# Patient Record
Sex: MALE | Race: WHITE | NOT HISPANIC OR LATINO | Employment: FULL TIME | URBAN - METROPOLITAN AREA
[De-identification: names, ages, dates, MRNs, and addresses within clinical notes are randomized per-mention and may not be internally consistent; named-entity substitution may affect disease eponyms.]

---

## 2017-01-03 ENCOUNTER — CLINICAL SUPPORT (OUTPATIENT)
Dept: REHABILITATION | Facility: HOSPITAL | Age: 37
End: 2017-01-03
Attending: ORTHOPAEDIC SURGERY
Payer: COMMERCIAL

## 2017-01-03 DIAGNOSIS — M25.512 CHRONIC LEFT SHOULDER PAIN: Primary | ICD-10-CM

## 2017-01-03 DIAGNOSIS — G89.29 CHRONIC LEFT SHOULDER PAIN: Primary | ICD-10-CM

## 2017-01-03 PROCEDURE — 97110 THERAPEUTIC EXERCISES: CPT

## 2017-01-03 PROCEDURE — 97161 PT EVAL LOW COMPLEX 20 MIN: CPT

## 2017-01-03 NOTE — PROGRESS NOTES
"[facility]   PATIENT EVALUATION    Date: 01/03/2017  Start Time: 8:00  Stop Time: 9:00    Patient Name: Vipul Urbano  Clinic Number: [id number]  Age: 36 y.o.  Gender: male    Diagnosis:   Encounter Diagnosis   Name Primary?    Chronic left shoulder pain Yes       Referring Physician: Ivania Spann MD  Treatment Orders: PT Eval and Treat      History     No past medical history on file.    No current outpatient prescriptions on file.     No current facility-administered medications for this visit.        Review of patient's allergies indicates:  No Known Allergies      Subjective     History of Present Condition: Pt reports that he was performing cross fit and had an overhead lift that he felt something painful. Pt reports pain has improved, but "something is still not right". Additionally, pt reports that head down position increases numbness/tingling in arms, but quickly subside with repositioning    Onset Date: Nov 2016  Date of Surgery: NA  Precautions: NA    Mechanism of Injury: sudden    Pain Location: shoulder   Pain Description: Aching  Current Pain: 0/10  Least Pain: 0/10  Worst Pain: 5/10  Aggravating Factors: overhead activity, sitting at desk for prolonged period of time  Relieving Factors: changing position    Diagnostic Tests:   1.  Rotator cuff tendinosis with low-grade partial-thickness undersurface tear of the distal supraspinatus tendon.  2.  Moderate acromioclavicular arthrosis.  Prior Therapy: Yes - pt recieved DN    Occupation:   Job Status: working  Job Duties: sitting, desk work    Sports/Recreational Activities: crossfit, running throwing with kids  Extremity Dominance: R    Prior Level of Function: Independent  Functional Deficits Leading to Referral/Nature of Injury: Pain with activity, pain with work  Patient Therapy Goals: reduce pain and return to crossfit, no pain following work day  Cultural/Environmental/Spiritual Barriers to " Treatment or Learning: none      Objective     Observation: Pt entered with no brace. Pt had no signs of bruising/swelling/redness noted.  Posture: FHP, rounded shoulder    Palpation: TTP upper trap, bicep tendon, coracoid process    Shoulder Range of Motion: ROM equal and WNL B    Joint Mobility: increased  anterior translation B    Strength:     Left:  Flex: 5/5  Abd: 5/5  IR: 3/5  ER: 3/5    Right:  Flex: 5/5  Abd: 5/5  IR: 3/5  ER: 3/5    Scapular Control/Dyskinesis: + inferior angle L scap dyskinesis    Special Tests:   +scapular assistance test  +Neers      Other: Pt demosntrated RC weakness B and scapular dyskinesis on L shoulder; posterior capsule/cuff tightness B    Treatment:   No money 3 x 10  Sleeper stretch 5:30 sec  ER wlakout 3 x 10  Foam roll x 5'    Functional Limitations Reports - G Codes  Category: Mobility  Tool: FOTO  Score: 62        Assessment     This is a 36 y.o. male referred to outpatient physical therapy and presents with a medical diagnosis of L shoulder pain and demonstrates limitations as described in the problem list. Pt demonstrates excellent rehab potential. Pt will benefit from physcial therapy services in order to maximize pain free and/or functional use of left shoulder. The following goals were discussed with the patient and patient is in agreement with them as to be addressed in the treatment plan. Pt was given a HEP consisting of     No money 3 x 10  Sleeper stretch 5:30 sec  ER wlakout 3 x 10  Foam roll x 10'    . Pt verbally understood the instructions as they were given and demonstrated proper form and technique during therapy. Pt was advised to perform these exercises free of pain, and to stop performing them if pain occurs.     Medical necessity is demonstrated by the following problem list:   - Pain limits function of effected part for all activities  - Unable to participate in daily activities   - Requires skilled supervision to complete and progress HEP  - Fall risk -  impaired balance   - Continued inability to participate in vocational pursuits    Short Term Goals (4-6 Weeks):  - Pt will increase IR strength to 4/5 B  - Pt will increase ER strength to 4/5 B  - Decrease Pain to 0 at rest  - Pt to self correct posture with scapular retractions   - Pt independent with HEP with progressions.     Long Term Goals (10-12 Weeks):  - Pt will increase IR strength to 5/5 B  - Pt will increase ER strength to 5/5 B in neutral  - Decrease Pain to 0 with lifting 5lbs overhead   - Pt to demonstrate 5/5 ER strength at 90/90   - Pt to return to throwing with no increase in s/s  - Pt to demonstrate proper throwing motion      Plan     Pt will be treated by physical therapy 1-2 times a week for 10-12 weeks for manual therapy, therapeutic exercise, home exercise program, patient education, and modalities PRN to achieve established goals. Vipul may at times be seen by a PTA as part of the Rehab Team.     Sheree Simpson, PT, DPT  01/03/2017    I CERTIFY THE NEED FOR THESE SERVICES FURNISHED UNDER THIS PLAN OF TREATMENT AND WHILE UNDER MY CARE    Physician's comments: ________________________________________________________________________________________________________________________________________________    Physician's Name: ___________________________________

## 2017-01-06 ENCOUNTER — CLINICAL SUPPORT (OUTPATIENT)
Dept: REHABILITATION | Facility: HOSPITAL | Age: 37
End: 2017-01-06
Attending: ORTHOPAEDIC SURGERY
Payer: COMMERCIAL

## 2017-01-06 DIAGNOSIS — M25.512 CHRONIC LEFT SHOULDER PAIN: Primary | ICD-10-CM

## 2017-01-06 DIAGNOSIS — G89.29 CHRONIC LEFT SHOULDER PAIN: Primary | ICD-10-CM

## 2017-01-06 PROCEDURE — 97110 THERAPEUTIC EXERCISES: CPT

## 2017-01-06 NOTE — PROGRESS NOTES
Physical Therapy Daily Note     Date: 01/06/2017  Name: Vipul Urbano  Clinic Number: [id number]  Diagnosis:   Encounter Diagnosis   Name Primary?    Chronic left shoulder pain Yes     Physician: Ivania Spann MD    Evaluation Date: 1/3  Date of Surgery:   Return to MD Date:   Visit #: 2 of   Start Time:  8:00  Stop Time:  9:00  Total Treatment Time: 60    Precautions: NA    Subjective     Pt reports that his HEP is going well. No major changes to report    Pain: not specified    Objective     Measurements taken:     Patient received individual therapy to increase strength, endurance, ROM, flexibility, posture and core stabilization with activities as follows:     Vipul received therapeutic exercises to develop strength, endurance, ROM, flexibility, posture and core stabilization for 45 minutes including:     No money 3 x 10  ER walkout 3 x 10  SL ER 3 x 10   SL flexion 3x 10  Prone row 3 x 10 #3  Prone ext 3 x 10  #3  LM 3 x 10  Punch 3 x 10    Sleeper stretch 5:30 sec     Cryotherapy x 10'    Vipul received the following manual therapy techniques: Joint mobilizations and Soft tissue Mobilization were applied to the:  for 0 minutes.       Written Home Exercises Provided: Yes  Pt demo good understanding of the education provided. Vipul demonstrated good return demonstration of activities.     Education provided:  Vipul verbalized good understanding of education provided.   No spiritual or educational barriers to learning identified.    Assessment       Excellent form with therex. Pt demonstrated good ability with scpaular retraction throughout therex. Cont to progress strengthening.     This is a 36 y.o. male referred to outpatient physical therapy and presents with a medical diagnosis of l shoulder painb and demonstrates limitations as described in the problem list Pt prognosis is Good. Pt will continue to benefit from skilled outpatient physical therapy to  address the deficits listed below in the problem list, provide pt/family education and to maximize pt's level of independence in the home and community environment.    Will the patient continue to benefit from skilled PT intervention? Yes        Medical necessity is demonstrated by:   - Pain limits function of effected part for all activities  - Unable to participate in daily activities   - Requires skilled supervision to complete and progress HEP  - Fall risk - impaired balance   - Continued inability to participate in vocational pursuits    Progress towards goals:     New/Revised Goals:       Plan   Continue with established Plan of Care towards PT goals.      Sheree Simpson, PT, DPT  01/06/2017

## 2017-01-06 NOTE — PLAN OF CARE
"[facility]   PATIENT EVALUATION    Date: 01/03/2017  Start Time: 8:00  Stop Time: 9:00    Patient Name: Vipul Urbano  Clinic Number: [id number]  Age: 36 y.o.  Gender: male    Diagnosis:   Encounter Diagnosis   Name Primary?    Chronic left shoulder pain Yes       Referring Physician: Ivania Spann MD  Treatment Orders: PT Eval and Treat      History     No past medical history on file.    No current outpatient prescriptions on file.     No current facility-administered medications for this visit.        Review of patient's allergies indicates:  No Known Allergies      Subjective     History of Present Condition: Pt reports that he was performing cross fit and had an overhead lift that he felt something painful. Pt reports pain has improved, but "something is still not right". Additionally, pt reports that head down position increases numbness/tingling in arms, but quickly subside with repositioning    Onset Date: Nov 2016  Date of Surgery: NA  Precautions: NA    Mechanism of Injury: sudden    Pain Location: shoulder   Pain Description: Aching  Current Pain: 0/10  Least Pain: 0/10  Worst Pain: 5/10  Aggravating Factors: overhead activity, sitting at desk for prolonged period of time  Relieving Factors: changing position    Diagnostic Tests:   1.  Rotator cuff tendinosis with low-grade partial-thickness undersurface tear of the distal supraspinatus tendon.  2.  Moderate acromioclavicular arthrosis.  Prior Therapy: Yes - pt recieved DN    Occupation:   Job Status: working  Job Duties: sitting, desk work    Sports/Recreational Activities: crossfit, running throwing with kids  Extremity Dominance: R    Prior Level of Function: Independent  Functional Deficits Leading to Referral/Nature of Injury: Pain with activity, pain with work  Patient Therapy Goals: reduce pain and return to crossfit, no pain following work day  Cultural/Environmental/Spiritual Barriers to " Treatment or Learning: none      Objective     Observation: Pt entered with no brace. Pt had no signs of bruising/swelling/redness noted.  Posture: FHP, rounded shoulder    Palpation: TTP upper trap, bicep tendon, coracoid process    Shoulder Range of Motion: ROM equal and WNL B    Joint Mobility: increased  anterior translation B    Strength:     Left:  Flex: 5/5  Abd: 5/5  IR: 3/5  ER: 3/5    Right:  Flex: 5/5  Abd: 5/5  IR: 3/5  ER: 3/5    Scapular Control/Dyskinesis: + inferior angle L scap dyskinesis    Special Tests:   +scapular assistance test  +Neers      Other: Pt demosntrated RC weakness B and scapular dyskinesis on L shoulder; posterior capsule/cuff tightness B    Treatment:   No money 3 x 10  Sleeper stretch 5:30 sec  ER wlakout 3 x 10  Foam roll x 5'    Functional Limitations Reports - G Codes  Category: Mobility  Tool: FOTO  Score: 62        Assessment     This is a 36 y.o. male referred to outpatient physical therapy and presents with a medical diagnosis of L shoulder pain and demonstrates limitations as described in the problem list. Pt demonstrates excellent rehab potential. Pt will benefit from physcial therapy services in order to maximize pain free and/or functional use of left shoulder. The following goals were discussed with the patient and patient is in agreement with them as to be addressed in the treatment plan. Pt was given a HEP consisting of     No money 3 x 10  Sleeper stretch 5:30 sec  ER wlakout 3 x 10  Foam roll x 10'    . Pt verbally understood the instructions as they were given and demonstrated proper form and technique during therapy. Pt was advised to perform these exercises free of pain, and to stop performing them if pain occurs.     Medical necessity is demonstrated by the following problem list:   - Pain limits function of effected part for all activities  - Unable to participate in daily activities   - Requires skilled supervision to complete and progress HEP  - Fall risk -  impaired balance   - Continued inability to participate in vocational pursuits    Short Term Goals (4-6 Weeks):  - Pt will increase IR strength to 4/5 B  - Pt will increase ER strength to 4/5 B  - Decrease Pain to 0 at rest  - Pt to self correct posture with scapular retractions   - Pt independent with HEP with progressions.     Long Term Goals (10-12 Weeks):  - Pt will increase IR strength to 5/5 B  - Pt will increase ER strength to 5/5 B in neutral  - Decrease Pain to 0 with lifting 5lbs overhead   - Pt to demonstrate 5/5 ER strength at 90/90   - Pt to return to throwing with no increase in s/s  - Pt to demonstrate proper throwing motion      Plan     Pt will be treated by physical therapy 1-2 times a week for 10-12 weeks for manual therapy, therapeutic exercise, home exercise program, patient education, and modalities PRN to achieve established goals. Vipul may at times be seen by a PTA as part of the Rehab Team.     Sheree Simpson, PT, DPT  01/03/2017    I CERTIFY THE NEED FOR THESE SERVICES FURNISHED UNDER THIS PLAN OF TREATMENT AND WHILE UNDER MY CARE    Physician's comments: ________________________________________________________________________________________________________________________________________________    Physician's Name: ___________________________________

## 2017-01-10 ENCOUNTER — CLINICAL SUPPORT (OUTPATIENT)
Dept: REHABILITATION | Facility: HOSPITAL | Age: 37
End: 2017-01-10
Attending: ORTHOPAEDIC SURGERY
Payer: COMMERCIAL

## 2017-01-10 DIAGNOSIS — M25.512 CHRONIC LEFT SHOULDER PAIN: Primary | ICD-10-CM

## 2017-01-10 DIAGNOSIS — G89.29 CHRONIC LEFT SHOULDER PAIN: Primary | ICD-10-CM

## 2017-01-10 PROCEDURE — 97110 THERAPEUTIC EXERCISES: CPT

## 2017-01-12 NOTE — PROGRESS NOTES
Physical Therapy Daily Note     Date: 01/12/2017  Name: Vipul Urbano  Clinic Number: [id number]  Diagnosis:   Encounter Diagnosis   Name Primary?    Chronic left shoulder pain Yes     Physician: Ivania Spann MD    Evaluation Date: 1/3  Date of Surgery:   Return to MD Date:   Visit #: 3 of   Start Time:  10:00  Stop Time: 11:00  Total Treatment Time: 60    Precautions: NA    Subjective     Pt reports that he feels about the same.  Pain: not specified    Objective     Measurements taken:     Patient received individual therapy to increase strength, endurance, ROM, flexibility, posture and core stabilization with activities as follows:     Vipul received therapeutic exercises to develop strength, endurance, ROM, flexibility, posture and core stabilization for 45 minutes including:     No money 3 x 10  ER walkout 3 x 10  SL ER 3 x 10   SL flexion 3x 10  Prone row 3 x 10 #3  Prone ext 3 x 10  #3  LM 3 x 10  Punch 3 x 10    Sleeper stretch 5:30 sec     Cryotherapy x 10'    Vipul received the following manual therapy techniques: Joint mobilizations and Soft tissue Mobilization were applied to the:  for 0 minutes.       Written Home Exercises Provided: Yes  Pt demo good understanding of the education provided. Vipul demonstrated good return demonstration of activities.     Education provided:  Vipul verbalized good understanding of education provided.   No spiritual or educational barriers to learning identified.    Assessment       No pain throughout therex. 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  Cont to progress strengthening.     This is a 36 y.o. male referred to outpatient physical therapy and presents with a medical diagnosis of l shoulder painb and demonstrates limitations as described in the problem list Pt prognosis is Good. Pt will continue to benefit from skilled outpatient physical therapy to address the deficits  listed below in the problem list, provide pt/family education and to maximize pt's level of independence in the home and community environment.    Will the patient continue to benefit from skilled PT intervention? Yes        Medical necessity is demonstrated by:   - Pain limits function of effected part for all activities  - Unable to participate in daily activities   - Requires skilled supervision to complete and progress HEP  - Fall risk - impaired balance   - Continued inability to participate in vocational pursuits    Progress towards goals:     New/Revised Goals:       Plan   Continue with established Plan of Care towards PT goals.      Sheree Simpson, PT, DPT  01/12/2017

## 2017-01-13 ENCOUNTER — CLINICAL SUPPORT (OUTPATIENT)
Dept: REHABILITATION | Facility: HOSPITAL | Age: 37
End: 2017-01-13
Attending: ORTHOPAEDIC SURGERY
Payer: COMMERCIAL

## 2017-01-13 DIAGNOSIS — G89.29 CHRONIC LEFT SHOULDER PAIN: Primary | ICD-10-CM

## 2017-01-13 DIAGNOSIS — M25.512 CHRONIC LEFT SHOULDER PAIN: Primary | ICD-10-CM

## 2017-01-13 PROCEDURE — 97110 THERAPEUTIC EXERCISES: CPT

## 2017-01-13 NOTE — PROGRESS NOTES
Physical Therapy Daily Note     Date: 01/13/2017  Name: Vipul Urbano  Clinic Number: [id number]  Diagnosis:   Encounter Diagnosis   Name Primary?    Chronic left shoulder pain Yes     Physician: Ivania Spann MD    Evaluation Date: 1/3  Date of Surgery:   Return to MD Date:   Visit #: 4 of   Start Time:  10:00  Stop Time: 11:00  Total Treatment Time: 60    Precautions: NA    Subjective     Pt reports pain in shoulder blade. He reports pain began, but it's minimal  Pain: not specified    Objective     Measurements taken:     Patient received individual therapy to increase strength, endurance, ROM, flexibility, posture and core stabilization with activities as follows:     Vipul received therapeutic exercises to develop strength, endurance, ROM, flexibility, posture and core stabilization for 45 minutes including:     Cervical retractions (supine, sitting) 3 x 10  No money 3 x 10  ER walkout 3 x 10  SL ER 3 x 10 MR  Prone row 3 x 10 MR  Prone ext 3 x 10  MR  LM 3 x 10  LM + ER 3 x 10  Punch 3 x 10    Sleeper stretch 5:30 sec     Cryotherapy x 10'    Vipul received the following manual therapy techniques: Joint mobilizations and Soft tissue Mobilization were applied to the:  for3 minutes.   Thoracic mobilization 2-5    Written Home Exercises Provided: Yes  Pt demo good understanding of the education provided. Vipul demonstrated good return demonstration of activities.     Education provided:  Vipul verbalized good understanding of education provided.   No spiritual or educational barriers to learning identified.    Assessment       Pain along medial shoulder blade did not appear to be related to neck pain. Pt demonstrated excellent form with scapular motion.   Cont to progress strengthening.     This is a 36 y.o. male referred to outpatient physical therapy and presents with a medical diagnosis of l shoulder painb and demonstrates limitations as described in  the problem list Pt prognosis is Good. Pt will continue to benefit from skilled outpatient physical therapy to address the deficits listed below in the problem list, provide pt/family education and to maximize pt's level of independence in the home and community environment.    Will the patient continue to benefit from skilled PT intervention? Yes        Medical necessity is demonstrated by:   - Pain limits function of effected part for all activities  - Unable to participate in daily activities   - Requires skilled supervision to complete and progress HEP  - Fall risk - impaired balance   - Continued inability to participate in vocational pursuits    Progress towards goals:     New/Revised Goals:       Plan   Continue with established Plan of Care towards PT goals.      Sheree Simpson, PT, DPT  01/13/2017

## 2017-01-17 ENCOUNTER — CLINICAL SUPPORT (OUTPATIENT)
Dept: REHABILITATION | Facility: HOSPITAL | Age: 37
End: 2017-01-17
Attending: ORTHOPAEDIC SURGERY
Payer: COMMERCIAL

## 2017-01-17 DIAGNOSIS — M25.512 CHRONIC LEFT SHOULDER PAIN: Primary | ICD-10-CM

## 2017-01-17 DIAGNOSIS — G89.29 CHRONIC LEFT SHOULDER PAIN: Primary | ICD-10-CM

## 2017-01-17 PROCEDURE — 97140 MANUAL THERAPY 1/> REGIONS: CPT

## 2017-01-17 PROCEDURE — 97012 MECHANICAL TRACTION THERAPY: CPT

## 2017-01-17 NOTE — PROGRESS NOTES
Physical Therapy Daily Note     Date: 01/17/2017  Name: Vipul Urbano  Clinic Number: [id number]  Diagnosis:   Encounter Diagnosis   Name Primary?    Chronic left shoulder pain Yes     Physician: Ivania Spann MD    Evaluation Date: 1/3  Date of Surgery:   Return to MD Date:   Visit #: 5 of   Start Time:  10:00  Stop Time: 11:00  Total Treatment Time: 60    Precautions: NA    Subjective     Pt reports pain was moving. He demonstrated pain along lower cervical nerve distribution. No TTP noted.   Pain: not specified    Objective     Measurements taken:     Patient received individual therapy to increase strength, endurance, ROM, flexibility, posture and core stabilization with activities as follows:     Vipul received therapeutic exercises to develop strength, endurance, ROM, flexibility, posture and core stabilization for 15 minutes including:     Upper trap stretch 5:30sec  Cervical retractions (supine, sitting) 3 x 10  ER/IR walkout 3 x 10  Squat to W 3 x 10    NP  No money 3 x 10  SL ER 3 x 10 MR  Prone row 3 x 10 MR  Prone ext 3 x 10  MR  LM 3 x 10  LM + ER 3 x 10  Punch 3 x 10  Sleeper stretch 5:30 sec     Cryotherapy x 10'      Traction x 10'    Vipul received the following manual therapy techniques: Joint mobilizations and Soft tissue Mobilization were applied to the:  for15 minutes.   Thoracic mobs grades 3-5  cerivcal mobs grade 3-4  Manual traction      Written Home Exercises Provided: Yes  Pt demo good understanding of the education provided. Vipul demonstrated good return demonstration of activities.     Education provided:  Vipul verbalized good understanding of education provided.   No spiritual or educational barriers to learning identified.    Assessment       Pain along medial shoulder blade did not appear to be related to neck pain. Pt demonstrated excellent form with scapular motion.   Cont to progress strengthening.   PT reviewed FOTO  scores for Vipul Urbano on 1/17/2017  FOTO score: 88      This is a 36 y.o. male referred to outpatient physical therapy and presents with a medical diagnosis of l shoulder painb and demonstrates limitations as described in the problem list Pt prognosis is Good. Pt will continue to benefit from skilled outpatient physical therapy to address the deficits listed below in the problem list, provide pt/family education and to maximize pt's level of independence in the home and community environment.    Will the patient continue to benefit from skilled PT intervention? Yes        Medical necessity is demonstrated by:   - Pain limits function of effected part for all activities  - Unable to participate in daily activities   - Requires skilled supervision to complete and progress HEP  - Fall risk - impaired balance   - Continued inability to participate in vocational pursuits    Progress towards goals:     New/Revised Goals:       Plan   Continue with established Plan of Care towards PT goals.      Sheree Simpson, PT, DPT  01/17/2017

## 2017-01-20 ENCOUNTER — CLINICAL SUPPORT (OUTPATIENT)
Dept: REHABILITATION | Facility: HOSPITAL | Age: 37
End: 2017-01-20
Attending: ORTHOPAEDIC SURGERY
Payer: COMMERCIAL

## 2017-01-20 DIAGNOSIS — G89.29 CHRONIC LEFT SHOULDER PAIN: Primary | ICD-10-CM

## 2017-01-20 DIAGNOSIS — M25.512 CHRONIC LEFT SHOULDER PAIN: Primary | ICD-10-CM

## 2017-01-20 PROCEDURE — 97110 THERAPEUTIC EXERCISES: CPT

## 2017-01-20 NOTE — PROGRESS NOTES
Physical Therapy Daily Note     Date: 01/20/2017  Name: Vipul Urbano  Clinic Number: [id number]  Diagnosis:   Encounter Diagnosis   Name Primary?    Chronic left shoulder pain Yes     Physician: Ivania Spann MD    Evaluation Date: 1/3  Date of Surgery:   Return to MD Date:   Visit #: 6 of   Start Time:  8:00  Stop Time: 9:00  Total Treatment Time: 60    Precautions: NA    Subjective     Pt reports improved pain along scapula. He notes that some tightness returned, but overall pain is improved. Pt would like to return to overhead activity (i.e.  press)  Pain: not specified    Objective     Measurements taken:     Patient received individual therapy to increase strength, endurance, ROM, flexibility, posture and core stabilization with activities as follows:     Vipul received therapeutic exercises to develop strength, endurance, ROM, flexibility, posture and core stabilization for 15 minutes including:       tractionx 10' 23# on 8 # off    ER/IR walkout 3 x 10  Squat to W 3 x 10  Combo 3 x 10  No money 3 x 10  SL ER 3 x 10 MR  Prone row 3 x 10 MR  Prone ext 3 x 10  MR  LM 3 x 10  LM + ER 3 x 10  Punch 3 x 10  Sleeper stretch 5:30 sec     Cryotherapy x 10'      Traction x 10'    Vpiul received the following manual therapy techniques: Joint mobilizations and Soft tissue Mobilization were applied to the:  For 5minutes.   Posterior capsule stretch  IR stretch    Written Home Exercises Provided: Yes  Pt demo good understanding of the education provided. Vipul demonstrated good return demonstration of activities.     Education provided:  Vipul verbalized good understanding of education provided.   No spiritual or educational barriers to learning identified.    Assessment       Improved scapular pain with traction therapy. Pt demonstrated excellent form with scapular therex. Pt educated regarding overhead motion, but is allowed to return to light lifting.  Cont to progress strengthening.   PT reviewed FOTO scores for Vipul Urbano on 1/17/2017  FOTO score: 88      This is a 36 y.o. male referred to outpatient physical therapy and presents with a medical diagnosis of l shoulder painb and demonstrates limitations as described in the problem list Pt prognosis is Good. Pt will continue to benefit from skilled outpatient physical therapy to address the deficits listed below in the problem list, provide pt/family education and to maximize pt's level of independence in the home and community environment.    Will the patient continue to benefit from skilled PT intervention? Yes        Medical necessity is demonstrated by:   - Pain limits function of effected part for all activities  - Unable to participate in daily activities   - Requires skilled supervision to complete and progress HEP  - Fall risk - impaired balance   - Continued inability to participate in vocational pursuits    Progress towards goals:     New/Revised Goals:       Plan   Continue with established Plan of Care towards PT goals.      Sheree Simpson, PT, DPT  01/20/2017

## 2017-01-24 ENCOUNTER — CLINICAL SUPPORT (OUTPATIENT)
Dept: REHABILITATION | Facility: HOSPITAL | Age: 37
End: 2017-01-24
Attending: ORTHOPAEDIC SURGERY
Payer: COMMERCIAL

## 2017-01-24 DIAGNOSIS — G89.29 CHRONIC LEFT SHOULDER PAIN: Primary | ICD-10-CM

## 2017-01-24 DIAGNOSIS — M25.512 CHRONIC LEFT SHOULDER PAIN: Primary | ICD-10-CM

## 2017-01-24 PROCEDURE — 97110 THERAPEUTIC EXERCISES: CPT

## 2017-01-24 PROCEDURE — 97012 MECHANICAL TRACTION THERAPY: CPT

## 2017-01-24 NOTE — PROGRESS NOTES
Physical Therapy Daily Note     Date: 01/24/2017  Name: Vipul Urbano  Clinic Number: [id number]  Diagnosis:   Encounter Diagnosis   Name Primary?    Chronic left shoulder pain Yes     Physician: Ivania Spann MD    Evaluation Date: 1/3  Date of Surgery:   Return to MD Date:   Visit #: 7 of   Start Time:  7:55  Stop Time: 8:55  Total Treatment Time: 60    Precautions: NA    Subjective     Pt reports pain was moving. He demonstrated pain along lower cervical nerve distribution. No TTP noted.   Pain: not specified    Objective     Measurements taken:     Patient received individual therapy to increase strength, endurance, ROM, flexibility, posture and core stabilization with activities as follows:     Vipul received therapeutic exercises to develop strength, endurance, ROM, flexibility, posture and core stabilization for 45 minutes including:     Upper trap stretch 5:30sec  Cervical retractions (supine, sitting) 3 x 10  ER/IR walkout 3 x 10  Squat to W 3 x 10  No money 3 x 10  SL ER 3 x 10 MR  Prone row 3 x 10 MR  Prone ext 3 x 10  MR  LM 3 x 10  LM + ER 3 x 10  Punch 3 x 10  Sleeper stretch 5:30 sec     Traction x 10'    Vipul received the following manual therapy techniques: Joint mobilizations and Soft tissue Mobilization were applied to the:  For 0 minutes.   Thoracic mobs grades 3-5  cerivcal mobs grade 3-4  Manual traction      Written Home Exercises Provided: Yes  Pt demo good understanding of the education provided. Vipul demonstrated good return demonstration of activities.     Education provided:  Vipul verbalized good understanding of education provided.   No spiritual or educational barriers to learning identified.    Assessment       Pain along medial shoulder blade did not appear to be related to neck pain. Pt demonstrated excellent form with scapular motion.   Cont to progress strengthening.     PT reviewed FOTO scores for Vipul Urbano  on 1/17/2017  FOTO score: 88      This is a 36 y.o. male referred to outpatient physical therapy and presents with a medical diagnosis of l shoulder painb and demonstrates limitations as described in the problem list Pt prognosis is Good. Pt will continue to benefit from skilled outpatient physical therapy to address the deficits listed below in the problem list, provide pt/family education and to maximize pt's level of independence in the home and community environment.    Will the patient continue to benefit from skilled PT intervention? Yes        Medical necessity is demonstrated by:   - Pain limits function of effected part for all activities  - Unable to participate in daily activities   - Requires skilled supervision to complete and progress HEP  - Fall risk - impaired balance   - Continued inability to participate in vocational pursuits    Progress towards goals:     New/Revised Goals:       Plan   Continue with established Plan of Care towards PT goals.      Sheree Simpson, PT, DPT  01/24/2017

## 2017-01-31 ENCOUNTER — CLINICAL SUPPORT (OUTPATIENT)
Dept: REHABILITATION | Facility: HOSPITAL | Age: 37
End: 2017-01-31
Attending: ORTHOPAEDIC SURGERY
Payer: COMMERCIAL

## 2017-01-31 DIAGNOSIS — M25.512 CHRONIC LEFT SHOULDER PAIN: Primary | ICD-10-CM

## 2017-01-31 DIAGNOSIS — G89.29 CHRONIC LEFT SHOULDER PAIN: Primary | ICD-10-CM

## 2017-01-31 PROCEDURE — 97110 THERAPEUTIC EXERCISES: CPT

## 2017-01-31 NOTE — PROGRESS NOTES
"                                                  Physical Therapy Daily Note     Date: 01/31/2017  Name: Vipul Urbano  Clinic Number: [id number]  Diagnosis:   Encounter Diagnosis   Name Primary?    Chronic left shoulder pain Yes     Physician: Ivania Spann MD    Evaluation Date: 1/3  Date of Surgery:   Return to MD Date:   Visit #: 8 of   Start Time:  7:45  Stop Time: 8:20  Total Treatment Time: 60    Precautions: NA    Subjective     "I have to leave early for a conference call" - No pain reportred. Pt states that he did overhead lifting with 35# and did not have increase in s/s  Pain: not specified    Objective     Measurements taken:     Patient received individual therapy to increase strength, endurance, ROM, flexibility, posture and core stabilization with activities as follows:     Vipul received therapeutic exercises to develop strength, endurance, ROM, flexibility, posture and core stabilization for 25 minutes including:       ER/IR walkout 3 x 10  Squat to W 3 x 10  Combo with lunge 3 x 10   SL ER 3 x 10 MR  Prone row 3 x 10 MR  Prone ext 3 x 10  MR  Sleeper stretch 5:30 sec  NP  LM 3 x 10  LM + ER 3 x 10           Vipul received the following manual therapy techniques: Joint mobilizations and Soft tissue Mobilization were applied to the:  For 0 minutes.   Thoracic mobs grades 3-5  cerivcal mobs grade 3-4  Manual traction      Written Home Exercises Provided: Yes  Pt demo good understanding of the education provided. Vipul demonstrated good return demonstration of activities.     Education provided:  Vipul verbalized good understanding of education provided.   No spiritual or educational barriers to learning identified.    Assessment       No pain with therex. Pt demonswtrated excellent scapular form and improved IR ROM.  Progress to home program if pt remains pain free.     PT reviewed FOTO scores for Vipul Urbano on 1/17/2017  FOTO score: 88      This is a 36 y.o. male referred to " outpatient physical therapy and presents with a medical diagnosis of l shoulder painb and demonstrates limitations as described in the problem list Pt prognosis is Good. Pt will continue to benefit from skilled outpatient physical therapy to address the deficits listed below in the problem list, provide pt/family education and to maximize pt's level of independence in the home and community environment.    Will the patient continue to benefit from skilled PT intervention? Yes        Medical necessity is demonstrated by:   - Pain limits function of effected part for all activities  - Unable to participate in daily activities   - Requires skilled supervision to complete and progress HEP  - Fall risk - impaired balance   - Continued inability to participate in vocational pursuits    Progress towards goals:     New/Revised Goals:       Plan   Continue with established Plan of Care towards PT goals.      Sheree Simpson, PT, DPT  01/31/2017

## 2017-02-02 ENCOUNTER — CLINICAL SUPPORT (OUTPATIENT)
Dept: REHABILITATION | Facility: HOSPITAL | Age: 37
End: 2017-02-02
Attending: ORTHOPAEDIC SURGERY
Payer: COMMERCIAL

## 2017-02-02 DIAGNOSIS — G89.29 CHRONIC LEFT SHOULDER PAIN: Primary | ICD-10-CM

## 2017-02-02 DIAGNOSIS — M25.512 CHRONIC LEFT SHOULDER PAIN: Primary | ICD-10-CM

## 2017-02-02 PROCEDURE — 97110 THERAPEUTIC EXERCISES: CPT

## 2017-02-02 NOTE — PROGRESS NOTES
Physical Therapy Daily Note     Date: 02/02/2017  Name: Vipul Urbano  Clinic Number: [id number]  Diagnosis:   Encounter Diagnosis   Name Primary?    Chronic left shoulder pain Yes     Physician: Ivania Spann MD    Evaluation Date: 1/3  Date of Surgery:   Return to MD Date:   Visit #: 8 of   Start Time:  7:45  Stop Time: 8:20  Total Treatment Time: 60    Precautions: NA    Subjective     Pt reports no increased pain. He states that he has resumed normal activity with mild soreness, but pain has been better.   Pain: 0    Objective     Measurements taken:     Patient received individual therapy to increase strength, endurance, ROM, flexibility, posture and core stabilization with activities as follows:     Vipul received therapeutic exercises to develop strength, endurance, ROM, flexibility, posture and core stabilization for 25 minutes including:       ER/IR walkout 3 x 10  IR/ER 3 x 10  90-90 walout 3 x 10  Squat to W 3 x 10  Combo with lunge 3 x 10   LM 3 x 10  LM + ER 3 x 10  Sleeper stretch 5:30 sec         Vipul received the following manual therapy techniques: Joint mobilizations and Soft tissue Mobilization were applied to the:  For 0 minutes.   Thoracic mobs grades 3-5  cerivcal mobs grade 3-4  Manual traction      Written Home Exercises Provided: Yes  Pt demo good understanding of the education provided. Vipul demonstrated good return demonstration of activities.     Education provided:  Vipul verbalized good understanding of education provided.   No spiritual or educational barriers to learning identified.    Assessment     Pt demonstrated excellent form with home program and may be progressed from PT.     PT reviewed FOTO scores for Vipul Urbano on 2/2/2017  FOTO score: 88      This is a 36 y.o. male referred to outpatient physical therapy and presents with a medical diagnosis of l shoulder painb and demonstrates limitations as described  in the problem list Pt prognosis is Good. Pt will continue to benefit from skilled outpatient physical therapy to address the deficits listed below in the problem list, provide pt/family education and to maximize pt's level of independence in the home and community environment.    Will the patient continue to benefit from skilled PT intervention? Yes        Medical necessity is demonstrated by:   - Pain limits function of effected part for all activities  - Unable to participate in daily activities   - Requires skilled supervision to complete and progress HEP  - Fall risk - impaired balance   - Continued inability to participate in vocational pursuits    Progress towards goals:     New/Revised Goals:    Short Term Goals (4-6 Weeks):  - Pt will increase IR strength to 4/5 B  - Pt will increase ER strength to 4/5 B  - Decrease Pain to 0 at rest  - Pt to self correct posture with scapular retractions   - Pt independent with HEP with progressions.      Long Term Goals (10-12 Weeks):  - Pt will increase IR strength to 5/5 B  - Pt will increase ER strength to 5/5 B in neutral  - Decrease Pain to 0 with lifting 5lbs overhead   - Pt to demonstrate 5/5 ER strength at 90/90   - Pt to return to throwing with no increase in s/s  - Pt to demonstrate proper throwing motion     Plan   Pt d/c from my POC.       Sheree Simpson, PT, DPT  02/02/2017

## 2021-03-29 ENCOUNTER — IMMUNIZATION (OUTPATIENT)
Dept: PRIMARY CARE CLINIC | Facility: CLINIC | Age: 41
End: 2021-03-29
Payer: COMMERCIAL

## 2021-03-29 DIAGNOSIS — Z23 NEED FOR VACCINATION: Primary | ICD-10-CM

## 2021-03-29 PROCEDURE — 91301 PR SARS-COV-2 COVID-19 VACCINE, NO PRSV, 100MCG/0.5ML, IM: ICD-10-PCS | Mod: S$GLB,,, | Performed by: INTERNAL MEDICINE

## 2021-03-29 PROCEDURE — 0011A PR IMMUNIZ ADMIN, SARS-COV-2 COVID-19 VACC, 100MCG/0.5ML, 1ST DOSE: CPT | Mod: CV19,S$GLB,, | Performed by: INTERNAL MEDICINE

## 2021-03-29 PROCEDURE — 91301 PR SARS-COV-2 COVID-19 VACCINE, NO PRSV, 100MCG/0.5ML, IM: CPT | Mod: S$GLB,,, | Performed by: INTERNAL MEDICINE

## 2021-03-29 PROCEDURE — 0011A PR IMMUNIZ ADMIN, SARS-COV-2 COVID-19 VACC, 100MCG/0.5ML, 1ST DOSE: ICD-10-PCS | Mod: CV19,S$GLB,, | Performed by: INTERNAL MEDICINE

## 2021-03-29 RX ADMIN — Medication 0.5 ML: at 07:03

## 2021-04-28 ENCOUNTER — IMMUNIZATION (OUTPATIENT)
Dept: PRIMARY CARE CLINIC | Facility: CLINIC | Age: 41
End: 2021-04-28
Payer: COMMERCIAL

## 2021-04-28 ENCOUNTER — OFFICE VISIT (OUTPATIENT)
Dept: UROLOGY | Facility: CLINIC | Age: 41
End: 2021-04-28
Payer: COMMERCIAL

## 2021-04-28 VITALS
HEART RATE: 70 BPM | DIASTOLIC BLOOD PRESSURE: 82 MMHG | SYSTOLIC BLOOD PRESSURE: 129 MMHG | WEIGHT: 183 LBS | BODY MASS INDEX: 27.74 KG/M2 | HEIGHT: 68 IN

## 2021-04-28 DIAGNOSIS — Z23 NEED FOR VACCINATION: Primary | ICD-10-CM

## 2021-04-28 DIAGNOSIS — Z30.09 VASECTOMY EVALUATION: Primary | ICD-10-CM

## 2021-04-28 PROCEDURE — 0012A PR IMMUNIZ ADMIN, SARS-COV-2 COVID-19 VACC, 100MCG/0.5ML, 2ND DOSE: CPT | Mod: CV19,S$GLB,, | Performed by: INTERNAL MEDICINE

## 2021-04-28 PROCEDURE — 99203 OFFICE O/P NEW LOW 30 MIN: CPT | Mod: S$GLB,,, | Performed by: UROLOGY

## 2021-04-28 PROCEDURE — 99203 PR OFFICE/OUTPT VISIT, NEW, LEVL III, 30-44 MIN: ICD-10-PCS | Mod: S$GLB,,, | Performed by: UROLOGY

## 2021-04-28 PROCEDURE — 91301 PR SARS-COV-2 COVID-19 VACCINE, NO PRSV, 100MCG/0.5ML, IM: CPT | Mod: S$GLB,,, | Performed by: INTERNAL MEDICINE

## 2021-04-28 PROCEDURE — 99999 PR PBB SHADOW E&M-EST. PATIENT-LVL I: CPT | Mod: PBBFAC,,, | Performed by: UROLOGY

## 2021-04-28 PROCEDURE — 0012A PR IMMUNIZ ADMIN, SARS-COV-2 COVID-19 VACC, 100MCG/0.5ML, 2ND DOSE: ICD-10-PCS | Mod: CV19,S$GLB,, | Performed by: INTERNAL MEDICINE

## 2021-04-28 PROCEDURE — 99999 PR PBB SHADOW E&M-EST. PATIENT-LVL I: ICD-10-PCS | Mod: PBBFAC,,, | Performed by: UROLOGY

## 2021-04-28 PROCEDURE — 91301 PR SARS-COV-2 COVID-19 VACCINE, NO PRSV, 100MCG/0.5ML, IM: ICD-10-PCS | Mod: S$GLB,,, | Performed by: INTERNAL MEDICINE

## 2021-04-28 RX ORDER — LORAZEPAM 0.5 MG/1
.25-.5 TABLET ORAL DAILY PRN
COMMUNITY
Start: 2021-03-26 | End: 2022-10-31

## 2021-04-28 RX ORDER — HYDROCODONE BITARTRATE AND ACETAMINOPHEN 5; 325 MG/1; MG/1
1 TABLET ORAL EVERY 6 HOURS PRN
Qty: 7 TABLET | Refills: 0 | Status: SHIPPED | OUTPATIENT
Start: 2021-04-28 | End: 2021-05-03

## 2021-04-28 RX ORDER — BUPROPION HYDROCHLORIDE 200 MG/1
200 TABLET, EXTENDED RELEASE ORAL 2 TIMES DAILY
COMMUNITY
Start: 2021-04-09 | End: 2023-11-16

## 2021-04-28 RX ORDER — BUSPIRONE HYDROCHLORIDE 15 MG/1
15 TABLET ORAL 2 TIMES DAILY
COMMUNITY
Start: 2021-04-09 | End: 2022-10-31

## 2021-04-28 RX ORDER — DIAZEPAM 5 MG/1
5 TABLET ORAL ONCE
Qty: 3 TABLET | Refills: 0 | Status: SHIPPED | OUTPATIENT
Start: 2021-04-28 | End: 2022-10-31

## 2021-04-28 RX ADMIN — Medication 0.5 ML: at 07:04

## 2021-08-16 ENCOUNTER — TELEPHONE (OUTPATIENT)
Dept: UROLOGY | Facility: CLINIC | Age: 41
End: 2021-08-16

## 2021-08-16 DIAGNOSIS — Z30.09 VASECTOMY EVALUATION: Primary | ICD-10-CM

## 2021-08-16 RX ORDER — HYDROCODONE BITARTRATE AND ACETAMINOPHEN 5; 325 MG/1; MG/1
1 TABLET ORAL EVERY 6 HOURS PRN
Qty: 7 TABLET | Refills: 0 | Status: SHIPPED | OUTPATIENT
Start: 2021-08-16 | End: 2021-08-21

## 2021-08-18 ENCOUNTER — PROCEDURE VISIT (OUTPATIENT)
Dept: UROLOGY | Facility: CLINIC | Age: 41
End: 2021-08-18
Payer: COMMERCIAL

## 2021-08-18 VITALS
BODY MASS INDEX: 31.71 KG/M2 | RESPIRATION RATE: 18 BRPM | DIASTOLIC BLOOD PRESSURE: 87 MMHG | HEIGHT: 68 IN | WEIGHT: 209.19 LBS | SYSTOLIC BLOOD PRESSURE: 131 MMHG | TEMPERATURE: 99 F | HEART RATE: 73 BPM

## 2021-08-18 DIAGNOSIS — Z30.09 VASECTOMY EVALUATION: ICD-10-CM

## 2021-08-18 DIAGNOSIS — Z30.2 ENCOUNTER FOR MALE STERILIZATION PROCEDURE: Primary | ICD-10-CM

## 2021-08-18 PROCEDURE — 55250 REMOVAL OF SPERM DUCT(S): CPT | Mod: S$GLB,,, | Performed by: UROLOGY

## 2021-08-18 PROCEDURE — 55250 PR REMOVAL OF SPERM DUCT(S): ICD-10-PCS | Mod: S$GLB,,, | Performed by: UROLOGY

## 2021-08-18 RX ORDER — LIDOCAINE HYDROCHLORIDE 10 MG/ML
20 INJECTION INFILTRATION; PERINEURAL
Status: COMPLETED | OUTPATIENT
Start: 2021-08-18 | End: 2021-08-18

## 2021-08-18 RX ADMIN — LIDOCAINE HYDROCHLORIDE 20 ML: 10 INJECTION INFILTRATION; PERINEURAL at 02:08

## 2021-10-15 ENCOUNTER — TELEPHONE (OUTPATIENT)
Dept: UROLOGY | Facility: CLINIC | Age: 41
End: 2021-10-15

## 2021-10-21 ENCOUNTER — TELEPHONE (OUTPATIENT)
Dept: UROLOGY | Facility: CLINIC | Age: 41
End: 2021-10-21

## 2022-10-26 ENCOUNTER — TELEPHONE (OUTPATIENT)
Dept: UROLOGY | Facility: CLINIC | Age: 42
End: 2022-10-26
Payer: COMMERCIAL

## 2022-10-26 NOTE — TELEPHONE ENCOUNTER
I spokek to pt now and scheduled appt carol Godoy NP on 10/31/22.  Pt VU.  ----- Message from Edie Morse sent at 10/26/2022 11:31 AM CDT -----  Type:  Sooner Apoointment Request    Caller is requesting a sooner appointment requesting a message be sent to doctor.  Name of Caller: Pt   When is the first available appointment? 11/14/2022  Symptoms: have pain in his testicles. For 5 days   Would the patient rather a call back or a response via [facility]? call  Best Call Back Number:[phone]  Additional Information:

## 2022-10-31 ENCOUNTER — OFFICE VISIT (OUTPATIENT)
Dept: UROLOGY | Facility: CLINIC | Age: 42
End: 2022-10-31
Payer: COMMERCIAL

## 2022-10-31 VITALS
SYSTOLIC BLOOD PRESSURE: 129 MMHG | HEART RATE: 55 BPM | DIASTOLIC BLOOD PRESSURE: 81 MMHG | WEIGHT: 188.13 LBS | HEIGHT: 68 IN | BODY MASS INDEX: 28.51 KG/M2

## 2022-10-31 DIAGNOSIS — N50.811 PAIN IN RIGHT TESTICLE: Primary | ICD-10-CM

## 2022-10-31 DIAGNOSIS — Z98.52 S/P VASECTOMY: ICD-10-CM

## 2022-10-31 LAB
BILIRUB SERPL-MCNC: NORMAL MG/DL
BLOOD URINE, POC: NORMAL
CLARITY, POC UA: CLEAR
COLOR, POC UA: NORMAL
GLUCOSE UR QL STRIP: NORMAL
KETONES UR QL STRIP: NORMAL
LEUKOCYTE ESTERASE URINE, POC: NORMAL
NITRITE, POC UA: NORMAL
PH, POC UA: 5
PROTEIN, POC: NORMAL
SPECIFIC GRAVITY, POC UA: 1
UROBILINOGEN, POC UA: NORMAL

## 2022-10-31 PROCEDURE — 81002 URINALYSIS NONAUTO W/O SCOPE: CPT | Mod: S$GLB,,, | Performed by: NURSE PRACTITIONER

## 2022-10-31 PROCEDURE — 1159F MED LIST DOCD IN RCRD: CPT | Mod: CPTII,S$GLB,, | Performed by: NURSE PRACTITIONER

## 2022-10-31 PROCEDURE — 3079F DIAST BP 80-89 MM HG: CPT | Mod: CPTII,S$GLB,, | Performed by: NURSE PRACTITIONER

## 2022-10-31 PROCEDURE — 3074F PR MOST RECENT SYSTOLIC BLOOD PRESSURE < 130 MM HG: ICD-10-PCS | Mod: CPTII,S$GLB,, | Performed by: NURSE PRACTITIONER

## 2022-10-31 PROCEDURE — 3074F SYST BP LT 130 MM HG: CPT | Mod: CPTII,S$GLB,, | Performed by: NURSE PRACTITIONER

## 2022-10-31 PROCEDURE — 99214 OFFICE O/P EST MOD 30 MIN: CPT | Mod: S$GLB,,, | Performed by: NURSE PRACTITIONER

## 2022-10-31 PROCEDURE — 3079F PR MOST RECENT DIASTOLIC BLOOD PRESSURE 80-89 MM HG: ICD-10-PCS | Mod: CPTII,S$GLB,, | Performed by: NURSE PRACTITIONER

## 2022-10-31 PROCEDURE — 99999 PR PBB SHADOW E&M-EST. PATIENT-LVL III: CPT | Mod: PBBFAC,,, | Performed by: NURSE PRACTITIONER

## 2022-10-31 PROCEDURE — 1160F RVW MEDS BY RX/DR IN RCRD: CPT | Mod: CPTII,S$GLB,, | Performed by: NURSE PRACTITIONER

## 2022-10-31 PROCEDURE — 1159F PR MEDICATION LIST DOCUMENTED IN MEDICAL RECORD: ICD-10-PCS | Mod: CPTII,S$GLB,, | Performed by: NURSE PRACTITIONER

## 2022-10-31 PROCEDURE — 99999 PR PBB SHADOW E&M-EST. PATIENT-LVL III: ICD-10-PCS | Mod: PBBFAC,,, | Performed by: NURSE PRACTITIONER

## 2022-10-31 PROCEDURE — 99214 PR OFFICE/OUTPT VISIT, EST, LEVL IV, 30-39 MIN: ICD-10-PCS | Mod: S$GLB,,, | Performed by: NURSE PRACTITIONER

## 2022-10-31 PROCEDURE — 1160F PR REVIEW ALL MEDS BY PRESCRIBER/CLIN PHARMACIST DOCUMENTED: ICD-10-PCS | Mod: CPTII,S$GLB,, | Performed by: NURSE PRACTITIONER

## 2022-10-31 PROCEDURE — 81002 POCT URINE DIPSTICK WITHOUT MICROSCOPE: ICD-10-PCS | Mod: S$GLB,,, | Performed by: NURSE PRACTITIONER

## 2022-10-31 RX ORDER — BUSPIRONE HYDROCHLORIDE 10 MG/1
20 TABLET ORAL EVERY MORNING
COMMUNITY
Start: 2022-10-20 | End: 2023-11-16

## 2022-10-31 RX ORDER — AZELASTINE 1 MG/ML
1 SPRAY, METERED NASAL 2 TIMES DAILY
COMMUNITY
Start: 2022-08-16

## 2022-10-31 RX ORDER — NAPROXEN 500 MG/1
500 TABLET ORAL 2 TIMES DAILY WITH MEALS
Qty: 30 TABLET | Refills: 1 | Status: SHIPPED | OUTPATIENT
Start: 2022-10-31 | End: 2022-11-11

## 2022-10-31 NOTE — PROGRESS NOTES
CHIEF COMPLAINT:    Vipul Urbano is a 42 y.o. male presents today for Testicle Pain.    HISTORY OF PRESENTING ILLINESS:    Vipul Urbano is a 42 y.o. male s/p Vasectomy with Dr. Williamson 08/18/2021.   He is here today due to some right testicle pain. The discomfort comes and goes. It is not constant. [place]  He also noticed some small areas of swelling to the left and right femoral areas. L>R  No urinary complaints.  No abdominal pain.             REVIEW OF SYSTEMS:  Review of Systems   Constitutional: Negative.  Negative for chills and fever.   Eyes:  Negative for double vision.   Respiratory:  Negative for cough and shortness of breath.    Cardiovascular:  Negative for chest pain.   Gastrointestinal:  Negative for abdominal pain, constipation, diarrhea, nausea and vomiting.   Genitourinary: Negative.  Negative for dysuria, flank pain and hematuria.        Ok with urination  Right testicle pain without swelling or redness.     Neurological:  Negative for dizziness and seizures.   Endo/Heme/Allergies:  Negative for polydipsia.       PATIENT HISTORY:    History reviewed. No pertinent past medical history.    History reviewed. No pertinent surgical history.    Family History   Problem Relation Age of Onset    No Known Problems Mother     No Known Problems Father        Social History     Socioeconomic History    Marital status:    Tobacco Use    Smoking status: Never    Smokeless tobacco: Never       Allergies:  Penicillins    Medications:    Current Outpatient Medications:     azelastine (ASTELIN) 137 mcg (0.1 %) nasal spray, 1 spray 2 (two) times daily., Disp: , Rfl:     buPROPion (WELLBUTRIN SR) 200 MG SR12, Take 200 mg by mouth 2 (two) times daily., Disp: , Rfl:     busPIRone (BUSPAR) 10 MG tablet, Take 20 mg by mouth every morning., Disp: , Rfl:     naproxen (NAPROSYN) 500 MG tablet, Take 1 tablet (500 mg total) by mouth 2 (two) times daily  with meals. for 15 days, Disp: 30 tablet, Rfl: 1    PHYSICAL EXAMINATION:  Physical Exam  Vitals and nursing note reviewed.   Constitutional:       General: He is awake.      Appearance: Normal appearance.   HENT:      Head: Normocephalic.      Right Ear: External ear normal.      Left Ear: External ear normal.      Nose: Nose normal.   Cardiovascular:      Rate and Rhythm: Normal rate.   Pulmonary:      Effort: Pulmonary effort is normal. No respiratory distress.   Abdominal:      Tenderness: There is no abdominal tenderness. There is no right CVA tenderness or left CVA tenderness.      Hernia: There is no hernia in the left inguinal area or right inguinal area.   Genitourinary:     Penis: Normal and circumcised. No hypospadias.       Testes: Normal. Cremasteric reflex is present.         Right: Mass, tenderness or swelling not present.         Left: Mass, tenderness or swelling not present.      Comments: Normal  exam     Musculoskeletal:         General: Normal range of motion.      Cervical back: Normal range of motion.   Lymphadenopathy:      Lower Body: No right inguinal adenopathy. No left inguinal adenopathy.   Skin:     General: Skin is warm and dry.   Neurological:      General: No focal deficit present.      Mental Status: He is alert and oriented to person, place, and time.   Psychiatric:         Mood and Affect: Mood normal.         Behavior: Behavior is cooperative.         LABS:      In office UA today was clear of active infection/blood.      No results found for: PSA, PSADIAG, PSATOTAL          IMPRESSION:    Encounter Diagnoses   Name Primary?    Pain in right testicle Yes    S/P vasectomy          Assessment:       1. Pain in right testicle    2. S/P vasectomy        Plan:         I spent 30 minutes with the patient of which more than half was spent in direct consultation with the patient in regards to our treatment and plan.  We addressed the office findings and recent labs.   Education and  recommendations of today's plan of care including home remedies and needed follow up with PCP.   We discussed the chief complaint/LUTS and the possible contributory factors.   Reassurance; no infection.   He was feeling his femoral lymph nodes but they are not enlarged.   Good scrotal support.  Naprosyn 500mg BID for 2 weeks; the for inflammation causing the testicular discomfort.   Recommended lifestyle modifications with proper, healthy diet, good hydration if no fluid restrictions; reducing bladder irritants.   Benefits of regular exercise approved by PCP.  RTC PRN

## 2022-11-11 ENCOUNTER — LAB VISIT (OUTPATIENT)
Dept: LAB | Facility: HOSPITAL | Age: 42
End: 2022-11-11
Attending: INTERNAL MEDICINE
Payer: COMMERCIAL

## 2022-11-11 ENCOUNTER — OFFICE VISIT (OUTPATIENT)
Dept: INTERNAL MEDICINE | Facility: CLINIC | Age: 42
End: 2022-11-11
Payer: COMMERCIAL

## 2022-11-11 VITALS
WEIGHT: 184 LBS | DIASTOLIC BLOOD PRESSURE: 80 MMHG | HEIGHT: 68 IN | RESPIRATION RATE: 18 BRPM | HEART RATE: 62 BPM | SYSTOLIC BLOOD PRESSURE: 128 MMHG | TEMPERATURE: 99 F | BODY MASS INDEX: 27.89 KG/M2 | OXYGEN SATURATION: 96 %

## 2022-11-11 DIAGNOSIS — Z11.4 ENCOUNTER FOR SCREENING FOR HIV: ICD-10-CM

## 2022-11-11 DIAGNOSIS — Z00.00 ANNUAL PHYSICAL EXAM: ICD-10-CM

## 2022-11-11 DIAGNOSIS — Z00.00 ANNUAL PHYSICAL EXAM: Primary | ICD-10-CM

## 2022-11-11 DIAGNOSIS — Z11.59 ENCOUNTER FOR HEPATITIS C SCREENING TEST FOR LOW RISK PATIENT: ICD-10-CM

## 2022-11-11 DIAGNOSIS — Z12.5 PROSTATE CANCER SCREENING: ICD-10-CM

## 2022-11-11 LAB
ALBUMIN SERPL BCP-MCNC: 4.5 G/DL (ref 3.5–5.2)
ALP SERPL-CCNC: 59 U/L (ref 55–135)
ALT SERPL W/O P-5'-P-CCNC: 21 U/L (ref 10–44)
ANION GAP SERPL CALC-SCNC: 9 MMOL/L (ref 8–16)
AST SERPL-CCNC: 19 U/L (ref 10–40)
BASOPHILS # BLD AUTO: 0.04 K/UL (ref 0–0.2)
BASOPHILS NFR BLD: 0.6 % (ref 0–1.9)
BILIRUB SERPL-MCNC: 1.7 MG/DL (ref 0.1–1)
BUN SERPL-MCNC: 18 MG/DL (ref 6–20)
CALCIUM SERPL-MCNC: 9.8 MG/DL (ref 8.7–10.5)
CHLORIDE SERPL-SCNC: 104 MMOL/L (ref 95–110)
CHOLEST SERPL-MCNC: 208 MG/DL (ref 120–199)
CHOLEST/HDLC SERPL: 4.2 {RATIO} (ref 2–5)
CO2 SERPL-SCNC: 28 MMOL/L (ref 23–29)
COMPLEXED PSA SERPL-MCNC: 1.4 NG/ML (ref 0–4)
CREAT SERPL-MCNC: 1.1 MG/DL (ref 0.5–1.4)
DIFFERENTIAL METHOD: ABNORMAL
EOSINOPHIL # BLD AUTO: 0.1 K/UL (ref 0–0.5)
EOSINOPHIL NFR BLD: 2.2 % (ref 0–8)
ERYTHROCYTE [DISTWIDTH] IN BLOOD BY AUTOMATED COUNT: 12.2 % (ref 11.5–14.5)
EST. GFR  (NO RACE VARIABLE): >60 ML/MIN/1.73 M^2
GLUCOSE SERPL-MCNC: 77 MG/DL (ref 70–110)
HCT VFR BLD AUTO: 45.6 % (ref 40–54)
HCV AB SERPL QL IA: NORMAL
HDLC SERPL-MCNC: 50 MG/DL (ref 40–75)
HDLC SERPL: 24 % (ref 20–50)
HGB BLD-MCNC: 15.2 G/DL (ref 14–18)
HIV 1+2 AB+HIV1 P24 AG SERPL QL IA: NORMAL
IMM GRANULOCYTES # BLD AUTO: 0.02 K/UL (ref 0–0.04)
IMM GRANULOCYTES NFR BLD AUTO: 0.3 % (ref 0–0.5)
LDLC SERPL CALC-MCNC: 141.8 MG/DL (ref 63–159)
LYMPHOCYTES # BLD AUTO: 2.4 K/UL (ref 1–4.8)
LYMPHOCYTES NFR BLD: 38.1 % (ref 18–48)
MCH RBC QN AUTO: 32.4 PG (ref 27–31)
MCHC RBC AUTO-ENTMCNC: 33.3 G/DL (ref 32–36)
MCV RBC AUTO: 97 FL (ref 82–98)
MONOCYTES # BLD AUTO: 0.5 K/UL (ref 0.3–1)
MONOCYTES NFR BLD: 8.5 % (ref 4–15)
NEUTROPHILS # BLD AUTO: 3.1 K/UL (ref 1.8–7.7)
NEUTROPHILS NFR BLD: 50.3 % (ref 38–73)
NONHDLC SERPL-MCNC: 158 MG/DL
NRBC BLD-RTO: 0 /100 WBC
PLATELET # BLD AUTO: 178 K/UL (ref 150–450)
PMV BLD AUTO: 10.8 FL (ref 9.2–12.9)
POTASSIUM SERPL-SCNC: 4.2 MMOL/L (ref 3.5–5.1)
PROT SERPL-MCNC: 7.4 G/DL (ref 6–8.4)
RBC # BLD AUTO: 4.69 M/UL (ref 4.6–6.2)
SODIUM SERPL-SCNC: 141 MMOL/L (ref 136–145)
TESTOST SERPL-MCNC: 1010 NG/DL (ref 304–1227)
TRIGL SERPL-MCNC: 81 MG/DL (ref 30–150)
WBC # BLD AUTO: 6.24 K/UL (ref 3.9–12.7)

## 2022-11-11 PROCEDURE — 1159F MED LIST DOCD IN RCRD: CPT | Mod: CPTII,S$GLB,, | Performed by: INTERNAL MEDICINE

## 2022-11-11 PROCEDURE — 1160F PR REVIEW ALL MEDS BY PRESCRIBER/CLIN PHARMACIST DOCUMENTED: ICD-10-PCS | Mod: CPTII,S$GLB,, | Performed by: INTERNAL MEDICINE

## 2022-11-11 PROCEDURE — 86803 HEPATITIS C AB TEST: CPT | Performed by: INTERNAL MEDICINE

## 2022-11-11 PROCEDURE — 84153 ASSAY OF PSA TOTAL: CPT | Performed by: INTERNAL MEDICINE

## 2022-11-11 PROCEDURE — 80061 LIPID PANEL: CPT | Performed by: INTERNAL MEDICINE

## 2022-11-11 PROCEDURE — 99999 PR PBB SHADOW E&M-EST. PATIENT-LVL III: ICD-10-PCS | Mod: PBBFAC,,, | Performed by: INTERNAL MEDICINE

## 2022-11-11 PROCEDURE — 99386 PR PREVENTIVE VISIT,NEW,40-64: ICD-10-PCS | Mod: S$GLB,,, | Performed by: INTERNAL MEDICINE

## 2022-11-11 PROCEDURE — 3074F SYST BP LT 130 MM HG: CPT | Mod: CPTII,S$GLB,, | Performed by: INTERNAL MEDICINE

## 2022-11-11 PROCEDURE — 1159F PR MEDICATION LIST DOCUMENTED IN MEDICAL RECORD: ICD-10-PCS | Mod: CPTII,S$GLB,, | Performed by: INTERNAL MEDICINE

## 2022-11-11 PROCEDURE — 99999 PR PBB SHADOW E&M-EST. PATIENT-LVL III: CPT | Mod: PBBFAC,,, | Performed by: INTERNAL MEDICINE

## 2022-11-11 PROCEDURE — 3079F PR MOST RECENT DIASTOLIC BLOOD PRESSURE 80-89 MM HG: ICD-10-PCS | Mod: CPTII,S$GLB,, | Performed by: INTERNAL MEDICINE

## 2022-11-11 PROCEDURE — 80053 COMPREHEN METABOLIC PANEL: CPT | Performed by: INTERNAL MEDICINE

## 2022-11-11 PROCEDURE — 3079F DIAST BP 80-89 MM HG: CPT | Mod: CPTII,S$GLB,, | Performed by: INTERNAL MEDICINE

## 2022-11-11 PROCEDURE — 87389 HIV-1 AG W/HIV-1&-2 AB AG IA: CPT | Performed by: INTERNAL MEDICINE

## 2022-11-11 PROCEDURE — 3008F PR BODY MASS INDEX (BMI) DOCUMENTED: ICD-10-PCS | Mod: CPTII,S$GLB,, | Performed by: INTERNAL MEDICINE

## 2022-11-11 PROCEDURE — 1160F RVW MEDS BY RX/DR IN RCRD: CPT | Mod: CPTII,S$GLB,, | Performed by: INTERNAL MEDICINE

## 2022-11-11 PROCEDURE — 3008F BODY MASS INDEX DOCD: CPT | Mod: CPTII,S$GLB,, | Performed by: INTERNAL MEDICINE

## 2022-11-11 PROCEDURE — 84403 ASSAY OF TOTAL TESTOSTERONE: CPT | Performed by: INTERNAL MEDICINE

## 2022-11-11 PROCEDURE — 3074F PR MOST RECENT SYSTOLIC BLOOD PRESSURE < 130 MM HG: ICD-10-PCS | Mod: CPTII,S$GLB,, | Performed by: INTERNAL MEDICINE

## 2022-11-11 PROCEDURE — 99386 PREV VISIT NEW AGE 40-64: CPT | Mod: S$GLB,,, | Performed by: INTERNAL MEDICINE

## 2022-11-11 PROCEDURE — 85025 COMPLETE CBC W/AUTO DIFF WBC: CPT | Performed by: INTERNAL MEDICINE

## 2022-11-11 PROCEDURE — 36415 COLL VENOUS BLD VENIPUNCTURE: CPT | Performed by: INTERNAL MEDICINE

## 2022-11-11 NOTE — PROGRESS NOTES
[facility] Note    Chief Complaint      Chief Complaint   Patient presents with    Establish Care       History of Present Illness      Vipul Urbano is a 42 y.o. male who presents today for   Chief Complaint   Patient presents with    [facility]   .  Patient comes to appointment here for reestablish visit . He needs full screening labs with this visit . He is dealing with some chronic left shoulder pain he is active with exercises.      HPI    No problem-specific Assessment & Plan notes found for this encounter.       Problem List Items Addressed This Visit          ID    Encounter for hepatitis C screening test for low risk patient    Overview     Hep c screen             Encounter for screening for HIV    Overview     hiv screen               Other    Annual physical exam - Primary    Overview     pe documented needs full screening labs           Other Visit Diagnoses       Prostate cancer screening                 Past Medical History:  History reviewed. No pertinent past medical history.    Past Surgical History:  Past Surgical History:   Procedure Laterality Date    EYE SURGERY  2009    VASECTOMY  2021       Family History:  family history includes Cancer in his maternal aunt, maternal grandfather, and paternal aunt; No Known Problems in his father and mother.     Social History:  Social History     Socioeconomic History    Marital status:    Tobacco Use    Smoking status: Never    Smokeless tobacco: Never   Substance and Sexual Activity    Alcohol use: Yes     Alcohol/week: 2.0 standard drinks     Types: 2 Cans of beer per week    Drug use: Not Currently    Sexual activity: Yes     Partners: Female     Birth control/protection: Partner-Vasectomy       Review of Systems:   Review of Systems   Constitutional:  Negative for fever and weight loss.   HENT:  Negative for congestion, hearing loss and sore throat.    Eyes:  Negative for blurred vision.   Respiratory:   "Negative for cough and shortness of breath.    Cardiovascular:  Negative for chest pain, palpitations, claudication and leg swelling.   Gastrointestinal:  Negative for abdominal pain, constipation, diarrhea and heartburn.   Genitourinary:  Negative for dysuria.   Musculoskeletal:  Negative for back pain and myalgias.   Skin:  Negative for rash.   Neurological:  Negative for focal weakness and headaches.   Psychiatric/Behavioral:  Negative for depression and suicidal ideas. The patient is not nervous/anxious.       Medications:  Outpatient Encounter Medications as of 11/11/2022   Medication Sig Dispense Refill    azelastine (ASTELIN) 137 mcg (0.1 %) nasal spray 1 spray 2 (two) times daily.      buPROPion (WELLBUTRIN SR) 200 MG SR12 Take 200 mg by mouth 2 (two) times daily.      busPIRone (BUSPAR) 10 MG tablet Take 20 mg by mouth every morning.      [DISCONTINUED] naproxen (NAPROSYN) 500 MG tablet Take 1 tablet (500 mg total) by mouth 2 (two) times daily with meals. for 15 days (Patient not taking: Reported on 11/11/2022) 30 tablet 1     No facility-administered encounter medications on file as of 11/11/2022.       Allergies:  Review of patient's allergies indicates:   Allergen Reactions    Penicillins          Physical Exam      Vitals:    11/11/22 1026   BP: 128/80   Pulse: 62   Resp: 18   Temp: 98.7 °F (37.1 °C)        Vital Signs  Temp: 98.7 °F (37.1 °C)  Temp src: Oral  Pulse: 62  Resp: 18  SpO2: 96 %  BP: 128/80  BP Location: Left arm  Patient Position: Sitting  Pain Score: 0-No pain  Height and Weight  Height: 5' 8" (172.7 cm)  Weight: 83.4 kg (183 lb 15.6 oz)  BSA (Calculated - sq m): 2 sq meters  BMI (Calculated): 28  Weight in (lb) to have BMI = 25: 164.1]     Body mass index is 27.97 kg/m².    Physical Exam  Constitutional:       Appearance: He is well-developed.   HENT:      Head: Normocephalic.   Eyes:      Pupils: Pupils are equal, round, and reactive to light.   Neck:      Thyroid: No thyromegaly. "   Cardiovascular:      Rate and Rhythm: Normal rate and regular rhythm.      Heart sounds: No murmur heard.    No friction rub. No gallop.   Pulmonary:      Effort: Pulmonary effort is normal.      Breath sounds: Normal breath sounds.   Abdominal:      General: Bowel sounds are normal.      Palpations: Abdomen is soft.   Musculoskeletal:         General: Normal range of motion.      Cervical back: Normal range of motion.   Skin:     General: Skin is warm and dry.   Neurological:      Mental Status: He is alert and oriented to person, place, and time.      Sensory: No sensory deficit.   Psychiatric:         Behavior: Behavior normal.        Laboratory:  CBC:  No results for input(s): WBC, RBC, HGB, HCT, PLT, MCV, MCH, MCHC in the last 2160 hours.  CMP:  No results for input(s): GLU, CALCIUM, ALBUMIN, PROT, NA, K, CO2, CL, BUN, ALKPHOS, ALT, AST, BILITOT in the last 2160 hours.    Invalid input(s): CREATININ  URINALYSIS:  Recent Labs   Lab Result Units 10/31/22  1026   Color, UA  Light Yellow   Clarity, UA  Clear   Spec Grav UA  1.005   pH, UA  5   Nitrite, UA  neg   Urobilinogen, UA  normal      LIPIDS:  No results for input(s): TSH, HDL, CHOL, TRIG, LDLCALC, CHOLHDL, NONHDLCHOL, TOTALCHOLEST in the last 2160 hours.  TSH:  No results for input(s): TSH in the last 2160 hours.  A1C:  No results for input(s): HGBA1C in the last 2160 hours.    Radiology:        Assessment:     Vipul Urbano is a 42 y.o.male with:    Annual physical exam  -     CBC Auto Differential; Future; Expected date: 11/11/2022  -     Comprehensive Metabolic Panel; Future; Expected date: 11/11/2022  -     Lipid Panel; Future; Expected date: 11/11/2022  -     Testosterone; Future; Expected date: 11/11/2022    Encounter for hepatitis C screening test for low risk patient  -     Hepatitis C Antibody; Future; Expected date: 11/11/2022    Encounter for screening for HIV  -     HIV 1/2 Ag/Ab (4th Gen); Future; Expected date: 11/11/2022    Prostate  cancer screening  -     PSA, Screening; Future; Expected date: 11/11/2022              Plan:     Problem List Items Addressed This Visit          ID    Encounter for hepatitis C screening test for low risk patient    Overview     Hep c screen             Encounter for screening for HIV    Overview     hiv screen               Other    Annual physical exam - Primary    Overview     pe documented needs full screening labs           Other Visit Diagnoses       Prostate cancer screening                As above, continue current medications and maintain follow up with specialists.  Return to clinic in 12 months.      Frederick W Dantagnan [facility] [place]

## 2023-02-13 PROBLEM — Z00.00 ANNUAL PHYSICAL EXAM: Status: RESOLVED | Noted: 2022-11-11 | Resolved: 2023-02-13

## 2023-11-08 ENCOUNTER — TELEPHONE (OUTPATIENT)
Dept: PRIMARY CARE CLINIC | Facility: CLINIC | Age: 43
End: 2023-11-08
Payer: COMMERCIAL

## 2023-11-08 NOTE — TELEPHONE ENCOUNTER
----- Message from Robert Colunga sent at 11/8/2023  9:02 AM CST -----  Regarding: Patient advice  Contact: Pt  Pt called in regards to rescheduling appointment,Unable to schedule Pt       Pt can be reached at  [phone]

## 2023-11-16 ENCOUNTER — OFFICE VISIT (OUTPATIENT)
Dept: PRIMARY CARE CLINIC | Facility: CLINIC | Age: 43
End: 2023-11-16
Payer: COMMERCIAL

## 2023-11-16 VITALS
OXYGEN SATURATION: 98 % | BODY MASS INDEX: 28.9 KG/M2 | DIASTOLIC BLOOD PRESSURE: 74 MMHG | TEMPERATURE: 98 F | RESPIRATION RATE: 18 BRPM | HEIGHT: 68 IN | HEART RATE: 58 BPM | SYSTOLIC BLOOD PRESSURE: 110 MMHG | WEIGHT: 190.69 LBS

## 2023-11-16 DIAGNOSIS — Z00.00 ANNUAL PHYSICAL EXAM: Primary | ICD-10-CM

## 2023-11-16 DIAGNOSIS — Z12.5 PROSTATE CANCER SCREENING: ICD-10-CM

## 2023-11-16 PROCEDURE — 3074F SYST BP LT 130 MM HG: CPT | Mod: CPTII,S$GLB,, | Performed by: INTERNAL MEDICINE

## 2023-11-16 PROCEDURE — 3008F PR BODY MASS INDEX (BMI) DOCUMENTED: ICD-10-PCS | Mod: CPTII,S$GLB,, | Performed by: INTERNAL MEDICINE

## 2023-11-16 PROCEDURE — 99396 PR PREVENTIVE VISIT,EST,40-64: ICD-10-PCS | Mod: S$GLB,,, | Performed by: INTERNAL MEDICINE

## 2023-11-16 PROCEDURE — 1159F MED LIST DOCD IN RCRD: CPT | Mod: CPTII,S$GLB,, | Performed by: INTERNAL MEDICINE

## 2023-11-16 PROCEDURE — 99999 PR PBB SHADOW E&M-EST. PATIENT-LVL III: CPT | Mod: PBBFAC,,, | Performed by: INTERNAL MEDICINE

## 2023-11-16 PROCEDURE — 3078F DIAST BP <80 MM HG: CPT | Mod: CPTII,S$GLB,, | Performed by: INTERNAL MEDICINE

## 2023-11-16 PROCEDURE — 3008F BODY MASS INDEX DOCD: CPT | Mod: CPTII,S$GLB,, | Performed by: INTERNAL MEDICINE

## 2023-11-16 PROCEDURE — 1160F PR REVIEW ALL MEDS BY PRESCRIBER/CLIN PHARMACIST DOCUMENTED: ICD-10-PCS | Mod: CPTII,S$GLB,, | Performed by: INTERNAL MEDICINE

## 2023-11-16 PROCEDURE — 3078F PR MOST RECENT DIASTOLIC BLOOD PRESSURE < 80 MM HG: ICD-10-PCS | Mod: CPTII,S$GLB,, | Performed by: INTERNAL MEDICINE

## 2023-11-16 PROCEDURE — 3074F PR MOST RECENT SYSTOLIC BLOOD PRESSURE < 130 MM HG: ICD-10-PCS | Mod: CPTII,S$GLB,, | Performed by: INTERNAL MEDICINE

## 2023-11-16 PROCEDURE — 1160F RVW MEDS BY RX/DR IN RCRD: CPT | Mod: CPTII,S$GLB,, | Performed by: INTERNAL MEDICINE

## 2023-11-16 PROCEDURE — 99396 PREV VISIT EST AGE 40-64: CPT | Mod: S$GLB,,, | Performed by: INTERNAL MEDICINE

## 2023-11-16 PROCEDURE — 1159F PR MEDICATION LIST DOCUMENTED IN MEDICAL RECORD: ICD-10-PCS | Mod: CPTII,S$GLB,, | Performed by: INTERNAL MEDICINE

## 2023-11-16 PROCEDURE — 99999 PR PBB SHADOW E&M-EST. PATIENT-LVL III: ICD-10-PCS | Mod: PBBFAC,,, | Performed by: INTERNAL MEDICINE

## 2023-11-17 ENCOUNTER — LAB VISIT (OUTPATIENT)
Dept: LAB | Facility: HOSPITAL | Age: 43
End: 2023-11-17
Attending: INTERNAL MEDICINE
Payer: COMMERCIAL

## 2023-11-17 DIAGNOSIS — Z00.00 ANNUAL PHYSICAL EXAM: ICD-10-CM

## 2023-11-17 DIAGNOSIS — Z12.5 PROSTATE CANCER SCREENING: ICD-10-CM

## 2023-11-17 LAB
ALBUMIN SERPL BCP-MCNC: 4.1 G/DL (ref 3.5–5.2)
ALP SERPL-CCNC: 59 U/L (ref 55–135)
ALT SERPL W/O P-5'-P-CCNC: 36 U/L (ref 10–44)
ANION GAP SERPL CALC-SCNC: 8 MMOL/L (ref 8–16)
AST SERPL-CCNC: 33 U/L (ref 10–40)
BASOPHILS # BLD AUTO: 0.03 K/UL (ref 0–0.2)
BASOPHILS NFR BLD: 0.5 % (ref 0–1.9)
BILIRUB SERPL-MCNC: 1.5 MG/DL (ref 0.1–1)
BUN SERPL-MCNC: 21 MG/DL (ref 6–20)
CALCIUM SERPL-MCNC: 9.7 MG/DL (ref 8.7–10.5)
CHLORIDE SERPL-SCNC: 105 MMOL/L (ref 95–110)
CHOLEST SERPL-MCNC: 171 MG/DL (ref 120–199)
CHOLEST/HDLC SERPL: 3.9 {RATIO} (ref 2–5)
CO2 SERPL-SCNC: 29 MMOL/L (ref 23–29)
COMPLEXED PSA SERPL-MCNC: 1.2 NG/ML (ref 0–4)
CREAT SERPL-MCNC: 1.2 MG/DL (ref 0.5–1.4)
DIFFERENTIAL METHOD: ABNORMAL
EOSINOPHIL # BLD AUTO: 0.2 K/UL (ref 0–0.5)
EOSINOPHIL NFR BLD: 3.6 % (ref 0–8)
ERYTHROCYTE [DISTWIDTH] IN BLOOD BY AUTOMATED COUNT: 12.4 % (ref 11.5–14.5)
EST. GFR  (NO RACE VARIABLE): >60 ML/MIN/1.73 M^2
GLUCOSE SERPL-MCNC: 85 MG/DL (ref 70–110)
HCT VFR BLD AUTO: 42.7 % (ref 40–54)
HDLC SERPL-MCNC: 44 MG/DL (ref 40–75)
HDLC SERPL: 25.7 % (ref 20–50)
HGB BLD-MCNC: 14.2 G/DL (ref 14–18)
IMM GRANULOCYTES # BLD AUTO: 0.01 K/UL (ref 0–0.04)
IMM GRANULOCYTES NFR BLD AUTO: 0.2 % (ref 0–0.5)
LDLC SERPL CALC-MCNC: 109.6 MG/DL (ref 63–159)
LYMPHOCYTES # BLD AUTO: 2.8 K/UL (ref 1–4.8)
LYMPHOCYTES NFR BLD: 49.4 % (ref 18–48)
MCH RBC QN AUTO: 31.8 PG (ref 27–31)
MCHC RBC AUTO-ENTMCNC: 33.3 G/DL (ref 32–36)
MCV RBC AUTO: 96 FL (ref 82–98)
MONOCYTES # BLD AUTO: 0.6 K/UL (ref 0.3–1)
MONOCYTES NFR BLD: 10.9 % (ref 4–15)
NEUTROPHILS # BLD AUTO: 2 K/UL (ref 1.8–7.7)
NEUTROPHILS NFR BLD: 35.4 % (ref 38–73)
NONHDLC SERPL-MCNC: 127 MG/DL
NRBC BLD-RTO: 0 /100 WBC
PLATELET # BLD AUTO: 176 K/UL (ref 150–450)
PMV BLD AUTO: 10.9 FL (ref 9.2–12.9)
POTASSIUM SERPL-SCNC: 4.1 MMOL/L (ref 3.5–5.1)
PROT SERPL-MCNC: 6.9 G/DL (ref 6–8.4)
RBC # BLD AUTO: 4.46 M/UL (ref 4.6–6.2)
SODIUM SERPL-SCNC: 142 MMOL/L (ref 136–145)
TESTOST SERPL-MCNC: 684 NG/DL (ref 304–1227)
TRIGL SERPL-MCNC: 87 MG/DL (ref 30–150)
WBC # BLD AUTO: 5.61 K/UL (ref 3.9–12.7)

## 2023-11-17 PROCEDURE — 84403 ASSAY OF TOTAL TESTOSTERONE: CPT | Performed by: INTERNAL MEDICINE

## 2023-11-17 PROCEDURE — 85025 COMPLETE CBC W/AUTO DIFF WBC: CPT | Performed by: INTERNAL MEDICINE

## 2023-11-17 PROCEDURE — 84153 ASSAY OF PSA TOTAL: CPT | Performed by: INTERNAL MEDICINE

## 2023-11-17 PROCEDURE — 80061 LIPID PANEL: CPT | Performed by: INTERNAL MEDICINE

## 2023-11-17 PROCEDURE — 36415 COLL VENOUS BLD VENIPUNCTURE: CPT | Performed by: INTERNAL MEDICINE

## 2023-11-17 PROCEDURE — 80053 COMPREHEN METABOLIC PANEL: CPT | Performed by: INTERNAL MEDICINE

## 2023-11-21 ENCOUNTER — PATIENT MESSAGE (OUTPATIENT)
Dept: PRIMARY CARE CLINIC | Facility: CLINIC | Age: 43
End: 2023-11-21
Payer: COMMERCIAL

## 2024-02-19 PROBLEM — Z00.00 ANNUAL PHYSICAL EXAM: Status: RESOLVED | Noted: 2022-11-11 | Resolved: 2024-02-19

## 2024-10-17 ENCOUNTER — PATIENT MESSAGE (OUTPATIENT)
Dept: PRIMARY CARE CLINIC | Facility: CLINIC | Age: 44
End: 2024-10-17
Payer: COMMERCIAL

## 2024-10-17 DIAGNOSIS — Z13.6 ENCOUNTER FOR SCREENING FOR CARDIOVASCULAR DISORDERS: Primary | ICD-10-CM

## 2024-10-17 NOTE — TELEPHONE ENCOUNTER
Pt has attached results of labs performed at a [facility], states he was told to contact PCP for orders for Coronary Calcium Score because his LDL and apolipoprotein B were high

## 2024-10-24 ENCOUNTER — PATIENT MESSAGE (OUTPATIENT)
Dept: PRIMARY CARE CLINIC | Facility: CLINIC | Age: 44
End: 2024-10-24
Payer: COMMERCIAL

## 2024-10-24 ENCOUNTER — HOSPITAL ENCOUNTER (OUTPATIENT)
Dept: RADIOLOGY | Facility: HOSPITAL | Age: 44
Discharge: HOME | End: 2024-10-24
Attending: INTERNAL MEDICINE
Payer: COMMERCIAL

## 2024-10-24 DIAGNOSIS — Z13.6 ENCOUNTER FOR SCREENING FOR CARDIOVASCULAR DISORDERS: ICD-10-CM

## 2024-10-24 PROCEDURE — 75571 CT HRT W/O DYE W/CA TEST: CPT | Mod: 26,,, | Performed by: RADIOLOGY

## 2024-10-24 PROCEDURE — 75571 CT HRT W/O DYE W/CA TEST: CPT | Mod: TC

## 2025-03-18 NOTE — TELEPHONE ENCOUNTER
Scheduled 11/16 345   Detail Level: Simple Additional Notes: back doing much better with cosmetic treatments Render Risk Assessment In Note?: no Additional Notes: refer to eye doctor

## 2025-08-20 ENCOUNTER — LAB VISIT (OUTPATIENT)
Dept: LAB | Facility: HOSPITAL | Age: 45
End: 2025-08-20
Attending: INTERNAL MEDICINE
Payer: COMMERCIAL

## 2025-08-20 ENCOUNTER — PATIENT MESSAGE (OUTPATIENT)
Dept: PRIMARY CARE CLINIC | Facility: CLINIC | Age: 45
End: 2025-08-20
Payer: COMMERCIAL

## 2025-08-20 ENCOUNTER — TELEPHONE (OUTPATIENT)
Dept: PRIMARY CARE CLINIC | Facility: CLINIC | Age: 45
End: 2025-08-20
Payer: COMMERCIAL

## 2025-08-20 DIAGNOSIS — R19.7 DIARRHEA OF PRESUMED INFECTIOUS ORIGIN: Primary | ICD-10-CM

## 2025-08-20 DIAGNOSIS — R19.7 DIARRHEA OF PRESUMED INFECTIOUS ORIGIN: ICD-10-CM

## 2025-08-20 PROCEDURE — 87449 NOS EACH ORGANISM AG IA: CPT

## 2025-08-21 LAB
C DIFF GDH STL QL: NEGATIVE
C DIFF TOX A+B STL QL IA: NEGATIVE